# Patient Record
Sex: FEMALE | Race: WHITE | Employment: OTHER | URBAN - METROPOLITAN AREA
[De-identification: names, ages, dates, MRNs, and addresses within clinical notes are randomized per-mention and may not be internally consistent; named-entity substitution may affect disease eponyms.]

---

## 2017-06-02 ENCOUNTER — LAB ENCOUNTER (OUTPATIENT)
Dept: LAB | Age: 77
End: 2017-06-02
Attending: FAMILY MEDICINE
Payer: MEDICARE

## 2017-06-02 DIAGNOSIS — I10 HTN (HYPERTENSION): ICD-10-CM

## 2017-06-02 DIAGNOSIS — Z13.29 ENCOUNTER FOR SCREENING FOR OTHER SUSPECTED ENDOCRINE DISORDER: ICD-10-CM

## 2017-06-02 DIAGNOSIS — Z13.1 SCREENING FOR DIABETES MELLITUS: ICD-10-CM

## 2017-06-02 DIAGNOSIS — E78.5 HYPERLIPIDEMIA, UNSPECIFIED HYPERLIPIDEMIA TYPE: Primary | ICD-10-CM

## 2017-06-02 PROCEDURE — 85025 COMPLETE CBC W/AUTO DIFF WBC: CPT

## 2017-06-02 PROCEDURE — 36415 COLL VENOUS BLD VENIPUNCTURE: CPT

## 2017-06-02 PROCEDURE — 82306 VITAMIN D 25 HYDROXY: CPT

## 2017-06-02 PROCEDURE — 84439 ASSAY OF FREE THYROXINE: CPT

## 2017-06-02 PROCEDURE — 80061 LIPID PANEL: CPT

## 2017-06-02 PROCEDURE — 80053 COMPREHEN METABOLIC PANEL: CPT

## 2017-06-02 PROCEDURE — 83036 HEMOGLOBIN GLYCOSYLATED A1C: CPT

## 2017-06-02 PROCEDURE — 84443 ASSAY THYROID STIM HORMONE: CPT

## 2017-10-02 ENCOUNTER — HOSPITAL ENCOUNTER (OUTPATIENT)
Dept: MAMMOGRAPHY | Age: 77
Discharge: HOME | End: 2017-10-02
Attending: SURGERY
Payer: MEDICARE

## 2017-10-02 ENCOUNTER — TELEPHONE (OUTPATIENT)
Dept: SURGERY | Facility: CLINIC | Age: 77
End: 2017-10-02

## 2017-10-02 DIAGNOSIS — N60.91 ATYPICAL DUCTAL HYPERPLASIA OF RIGHT BREAST: ICD-10-CM

## 2017-10-02 DIAGNOSIS — Z12.31 ENCOUNTER FOR SCREENING MAMMOGRAM FOR HIGH-RISK PATIENT: ICD-10-CM

## 2017-10-02 DIAGNOSIS — Z12.31 ENCOUNTER FOR SCREENING MAMMOGRAM FOR HIGH-RISK PATIENT: Primary | ICD-10-CM

## 2017-10-02 PROCEDURE — 77063 BREAST TOMOSYNTHESIS BI: CPT | Performed by: SURGERY

## 2017-10-02 PROCEDURE — 77067 SCR MAMMO BI INCL CAD: CPT | Performed by: SURGERY

## 2017-10-05 ENCOUNTER — TELEPHONE (OUTPATIENT)
Dept: SURGERY | Facility: CLINIC | Age: 77
End: 2017-10-05

## 2017-10-05 NOTE — TELEPHONE ENCOUNTER
I notified the patient that Dr Orlando Murray had reviewed her last mammogram, \"the mammogram looks good.  She will be due for another exam prior to her next mammogram but should return to see me sooner with any new clinical concerns.  \"  Pt denies any new concer

## 2017-10-17 ENCOUNTER — HOSPITAL ENCOUNTER (OUTPATIENT)
Dept: BONE DENSITY | Age: 77
Discharge: HOME | End: 2017-10-17
Attending: FAMILY MEDICINE
Payer: MEDICARE

## 2017-10-17 DIAGNOSIS — M89.9 DISORDER OF BONE: ICD-10-CM

## 2017-10-17 PROCEDURE — 77080 DXA BONE DENSITY AXIAL: CPT | Performed by: FAMILY MEDICINE

## 2018-02-27 ENCOUNTER — OFFICE VISIT (OUTPATIENT)
Dept: SURGERY | Facility: CLINIC | Age: 78
End: 2018-02-27

## 2018-02-27 VITALS
HEART RATE: 83 BPM | TEMPERATURE: 98 F | WEIGHT: 123 LBS | RESPIRATION RATE: 18 BRPM | OXYGEN SATURATION: 99 % | HEIGHT: 64.5 IN | BODY MASS INDEX: 20.74 KG/M2

## 2018-02-27 DIAGNOSIS — Z12.39 BREAST CANCER SCREENING, HIGH RISK PATIENT: ICD-10-CM

## 2018-02-27 DIAGNOSIS — N60.91 ATYPICAL DUCTAL HYPERPLASIA OF RIGHT BREAST: Primary | ICD-10-CM

## 2018-02-27 PROCEDURE — 99214 OFFICE O/P EST MOD 30 MIN: CPT | Performed by: SURGERY

## 2018-05-21 NOTE — TELEPHONE ENCOUNTER
Bandar Avilez from [facility], called to say the current mammogram order is not approved per medicare with current dx code N60.91 for ADH.    Order placed with different code Spoke with the pt and advised of the notes from Dr. Arias Smith- he states that he did tolerate the lower dose of the norvasc.  Advised to go back to that dose- he v/u  Advised that Dr. Arias Smith wants him to start a different dieuretic and we will check his BP and BMP

## 2018-08-01 ENCOUNTER — LAB ENCOUNTER (OUTPATIENT)
Dept: LAB | Age: 78
End: 2018-08-01
Attending: FAMILY MEDICINE
Payer: MEDICARE

## 2018-08-01 DIAGNOSIS — M89.9 BONE DISEASE: ICD-10-CM

## 2018-08-01 DIAGNOSIS — E78.5 HYPERLIPEMIA: Primary | ICD-10-CM

## 2018-08-01 DIAGNOSIS — Z79.899 NEED FOR PROPHYLACTIC CHEMOTHERAPY: ICD-10-CM

## 2018-08-01 DIAGNOSIS — I25.10 CORONARY ATHEROSCLEROSIS OF NATIVE CORONARY ARTERY: ICD-10-CM

## 2018-08-01 LAB
ALBUMIN SERPL-MCNC: 3.4 G/DL (ref 3.5–4.8)
ALBUMIN/GLOB SERPL: 0.8 {RATIO} (ref 1–2)
ALP LIVER SERPL-CCNC: 101 U/L (ref 55–142)
ALT SERPL-CCNC: 19 U/L (ref 14–54)
ANION GAP SERPL CALC-SCNC: 6 MMOL/L (ref 0–18)
AST SERPL-CCNC: 19 U/L (ref 15–41)
BASOPHILS # BLD AUTO: 0.05 X10(3) UL (ref 0–0.1)
BASOPHILS NFR BLD AUTO: 0.6 %
BILIRUB SERPL-MCNC: 0.1 MG/DL (ref 0.1–2)
BILIRUB UR QL STRIP.AUTO: NEGATIVE
BUN BLD-MCNC: 21 MG/DL (ref 8–20)
BUN/CREAT SERPL: 18.3 (ref 10–20)
CALCIUM BLD-MCNC: 9.6 MG/DL (ref 8.3–10.3)
CHLORIDE SERPL-SCNC: 105 MMOL/L (ref 101–111)
CHOLEST SMN-MCNC: 223 MG/DL (ref ?–200)
CLARITY UR REFRACT.AUTO: CLEAR
CO2 SERPL-SCNC: 29 MMOL/L (ref 22–32)
COLOR UR AUTO: YELLOW
CREAT BLD-MCNC: 1.15 MG/DL (ref 0.55–1.02)
EOSINOPHIL # BLD AUTO: 0.11 X10(3) UL (ref 0–0.3)
EOSINOPHIL NFR BLD AUTO: 1.3 %
ERYTHROCYTE [DISTWIDTH] IN BLOOD BY AUTOMATED COUNT: 13.2 % (ref 11.5–16)
EST. AVERAGE GLUCOSE BLD GHB EST-MCNC: 114 MG/DL (ref 68–126)
GLOBULIN PLAS-MCNC: 4.4 G/DL (ref 2.5–3.7)
GLUCOSE BLD-MCNC: 94 MG/DL (ref 70–99)
GLUCOSE UR STRIP.AUTO-MCNC: NEGATIVE MG/DL
HBA1C MFR BLD HPLC: 5.6 % (ref ?–5.7)
HCT VFR BLD AUTO: 41.4 % (ref 34–50)
HDLC SERPL-MCNC: 83 MG/DL (ref 40–59)
HGB BLD-MCNC: 13.1 G/DL (ref 12–16)
IMMATURE GRANULOCYTE COUNT: 0.02 X10(3) UL (ref 0–1)
IMMATURE GRANULOCYTE RATIO %: 0.2 %
KETONES UR STRIP.AUTO-MCNC: NEGATIVE MG/DL
LDLC SERPL CALC-MCNC: 125 MG/DL (ref ?–100)
LEUKOCYTE ESTERASE UR QL STRIP.AUTO: NEGATIVE
LYMPHOCYTES # BLD AUTO: 2.66 X10(3) UL (ref 0.9–4)
LYMPHOCYTES NFR BLD AUTO: 31.6 %
M PROTEIN MFR SERPL ELPH: 7.8 G/DL (ref 6.1–8.3)
MCH RBC QN AUTO: 31.7 PG (ref 27–33.2)
MCHC RBC AUTO-ENTMCNC: 31.6 G/DL (ref 31–37)
MCV RBC AUTO: 100.2 FL (ref 81–100)
MONOCYTES # BLD AUTO: 0.84 X10(3) UL (ref 0.1–1)
MONOCYTES NFR BLD AUTO: 10 %
NEUTROPHIL ABS PRELIM: 4.73 X10 (3) UL (ref 1.3–6.7)
NEUTROPHILS # BLD AUTO: 4.73 X10(3) UL (ref 1.3–6.7)
NEUTROPHILS NFR BLD AUTO: 56.3 %
NITRITE UR QL STRIP.AUTO: NEGATIVE
NONHDLC SERPL-MCNC: 140 MG/DL (ref ?–130)
OSMOLALITY SERPL CALC.SUM OF ELEC: 293 MOSM/KG (ref 275–295)
PH UR STRIP.AUTO: 5 [PH] (ref 4.5–8)
PLATELET # BLD AUTO: 289 10(3)UL (ref 150–450)
POTASSIUM SERPL-SCNC: 4.2 MMOL/L (ref 3.6–5.1)
PROT UR STRIP.AUTO-MCNC: NEGATIVE MG/DL
RBC # BLD AUTO: 4.13 X10(6)UL (ref 3.8–5.1)
RBC UR QL AUTO: NEGATIVE
RED CELL DISTRIBUTION WIDTH-SD: 48.6 FL (ref 35.1–46.3)
SODIUM SERPL-SCNC: 140 MMOL/L (ref 136–144)
SP GR UR STRIP.AUTO: 1.01 (ref 1–1.03)
T4 FREE SERPL-MCNC: 1.1 NG/DL (ref 0.9–1.8)
TRIGL SERPL-MCNC: 77 MG/DL (ref 30–149)
TSI SER-ACNC: 2.41 MIU/ML (ref 0.35–5.5)
UROBILINOGEN UR STRIP.AUTO-MCNC: <2 MG/DL
VIT D+METAB SERPL-MCNC: 63.9 NG/ML (ref 30–100)
VLDLC SERPL CALC-MCNC: 15 MG/DL (ref 0–30)
WBC # BLD AUTO: 8.4 X10(3) UL (ref 4–13)

## 2018-08-01 PROCEDURE — 80053 COMPREHEN METABOLIC PANEL: CPT

## 2018-08-01 PROCEDURE — 81003 URINALYSIS AUTO W/O SCOPE: CPT

## 2018-08-01 PROCEDURE — 82306 VITAMIN D 25 HYDROXY: CPT

## 2018-08-01 PROCEDURE — 84439 ASSAY OF FREE THYROXINE: CPT

## 2018-08-01 PROCEDURE — 36415 COLL VENOUS BLD VENIPUNCTURE: CPT

## 2018-08-01 PROCEDURE — 85025 COMPLETE CBC W/AUTO DIFF WBC: CPT

## 2018-08-01 PROCEDURE — 83036 HEMOGLOBIN GLYCOSYLATED A1C: CPT

## 2018-08-01 PROCEDURE — 80061 LIPID PANEL: CPT

## 2018-08-01 PROCEDURE — 84443 ASSAY THYROID STIM HORMONE: CPT

## 2018-10-03 ENCOUNTER — HOSPITAL ENCOUNTER (OUTPATIENT)
Dept: MAMMOGRAPHY | Age: 78
Discharge: HOME | End: 2018-10-03
Attending: SURGERY
Payer: MEDICARE

## 2018-10-03 DIAGNOSIS — Z12.39 BREAST CANCER SCREENING, HIGH RISK PATIENT: ICD-10-CM

## 2018-10-03 PROCEDURE — 77063 BREAST TOMOSYNTHESIS BI: CPT | Performed by: SURGERY

## 2018-10-03 PROCEDURE — 77067 SCR MAMMO BI INCL CAD: CPT | Performed by: SURGERY

## 2018-12-04 ENCOUNTER — LAB ENCOUNTER (OUTPATIENT)
Dept: LAB | Age: 78
End: 2018-12-04
Attending: INTERNAL MEDICINE
Payer: MEDICARE

## 2018-12-04 DIAGNOSIS — E78.5 HYPERLIPEMIA: Primary | ICD-10-CM

## 2018-12-04 PROCEDURE — 36415 COLL VENOUS BLD VENIPUNCTURE: CPT

## 2018-12-04 PROCEDURE — 80061 LIPID PANEL: CPT

## 2019-06-19 ENCOUNTER — LAB ENCOUNTER (OUTPATIENT)
Dept: LAB | Age: 79
End: 2019-06-19
Attending: FAMILY MEDICINE
Payer: MEDICARE

## 2019-06-19 DIAGNOSIS — E78.5 HYPERLIPEMIA: ICD-10-CM

## 2019-06-19 DIAGNOSIS — I48.0 PAROXYSMAL ATRIAL FIBRILLATION (HCC): Primary | ICD-10-CM

## 2019-06-19 DIAGNOSIS — E55.9 AVITAMINOSIS D: ICD-10-CM

## 2019-06-19 DIAGNOSIS — Z13.1 SCREENING FOR DIABETES MELLITUS: ICD-10-CM

## 2019-06-19 PROCEDURE — 82306 VITAMIN D 25 HYDROXY: CPT

## 2019-06-19 PROCEDURE — 84439 ASSAY OF FREE THYROXINE: CPT

## 2019-06-19 PROCEDURE — 36415 COLL VENOUS BLD VENIPUNCTURE: CPT

## 2019-06-19 PROCEDURE — 80053 COMPREHEN METABOLIC PANEL: CPT

## 2019-06-19 PROCEDURE — 80061 LIPID PANEL: CPT

## 2019-06-19 PROCEDURE — 84443 ASSAY THYROID STIM HORMONE: CPT

## 2019-06-19 PROCEDURE — 83036 HEMOGLOBIN GLYCOSYLATED A1C: CPT

## 2019-06-19 PROCEDURE — 85025 COMPLETE CBC W/AUTO DIFF WBC: CPT

## 2019-10-04 ENCOUNTER — HOSPITAL ENCOUNTER (OUTPATIENT)
Dept: MAMMOGRAPHY | Age: 79
Discharge: HOME | End: 2019-10-04
Attending: INTERNAL MEDICINE
Payer: MEDICARE

## 2019-10-04 DIAGNOSIS — Z12.31 ENCOUNTER FOR SCREENING MAMMOGRAM FOR MALIGNANT NEOPLASM OF BREAST: ICD-10-CM

## 2019-10-04 PROCEDURE — 77067 SCR MAMMO BI INCL CAD: CPT | Performed by: INTERNAL MEDICINE

## 2019-10-04 PROCEDURE — 77063 BREAST TOMOSYNTHESIS BI: CPT | Performed by: INTERNAL MEDICINE

## 2020-01-01 ENCOUNTER — APPOINTMENT (OUTPATIENT)
Dept: GENERAL RADIOLOGY | Facility: HOSPITAL | Age: 80
End: 2020-01-01
Attending: EMERGENCY MEDICINE
Payer: MEDICARE

## 2020-01-01 ENCOUNTER — HOSPITAL ENCOUNTER (EMERGENCY)
Facility: HOSPITAL | Age: 80
Discharge: HOME | End: 2020-01-01
Attending: EMERGENCY MEDICINE
Payer: MEDICARE

## 2020-01-01 ENCOUNTER — HOSPITAL ENCOUNTER (OUTPATIENT)
Dept: GENERAL RADIOLOGY | Facility: HOSPITAL | Age: 80
Discharge: HOME | End: 2020-01-01
Attending: RADIOLOGY
Payer: MEDICARE

## 2020-01-01 ENCOUNTER — APPOINTMENT (OUTPATIENT)
Dept: LAB | Facility: HOSPITAL | Age: 80
End: 2020-01-01
Attending: FAMILY MEDICINE
Payer: MEDICARE

## 2020-01-01 ENCOUNTER — OFFICE VISIT (OUTPATIENT)
Dept: HEMATOLOGY/ONCOLOGY | Facility: HOSPITAL | Age: 80
End: 2020-01-01
Attending: INTERNAL MEDICINE
Payer: MEDICARE

## 2020-01-01 ENCOUNTER — APPOINTMENT (OUTPATIENT)
Dept: CT IMAGING | Facility: HOSPITAL | Age: 80
DRG: 054 | End: 2020-01-01
Attending: EMERGENCY MEDICINE
Payer: MEDICARE

## 2020-01-01 ENCOUNTER — HOSPITAL ENCOUNTER (INPATIENT)
Dept: RADIATION ONCOLOGY | Facility: HOSPITAL | Age: 80
Discharge: HOME | DRG: 054 | End: 2020-01-01
Attending: EMERGENCY MEDICINE
Payer: MEDICARE

## 2020-01-01 ENCOUNTER — HOSPITAL ENCOUNTER (INPATIENT)
Facility: HOSPITAL | Age: 80
LOS: 1 days | DRG: 054 | End: 2020-01-01
Attending: INTERNAL MEDICINE | Admitting: INTERNAL MEDICINE
Payer: OTHER MISCELLANEOUS

## 2020-01-01 ENCOUNTER — APPOINTMENT (OUTPATIENT)
Dept: MRI IMAGING | Facility: HOSPITAL | Age: 80
DRG: 054 | End: 2020-01-01
Attending: EMERGENCY MEDICINE
Payer: MEDICARE

## 2020-01-01 ENCOUNTER — HOSPITAL ENCOUNTER (OUTPATIENT)
Dept: CT IMAGING | Facility: HOSPITAL | Age: 80
Discharge: HOME | End: 2020-01-01
Attending: INTERNAL MEDICINE
Payer: MEDICARE

## 2020-01-01 ENCOUNTER — APPOINTMENT (OUTPATIENT)
Dept: LAB | Facility: HOSPITAL | Age: 80
End: 2020-01-01
Attending: INTERNAL MEDICINE
Payer: MEDICARE

## 2020-01-01 ENCOUNTER — HOSPITAL ENCOUNTER (INPATIENT)
Facility: HOSPITAL | Age: 80
LOS: 2 days | Discharge: HOSPICE | DRG: 054 | End: 2020-01-01
Attending: EMERGENCY MEDICINE | Admitting: INTERNAL MEDICINE
Payer: MEDICARE

## 2020-01-01 VITALS
WEIGHT: 105 LBS | TEMPERATURE: 97 F | RESPIRATION RATE: 18 BRPM | HEIGHT: 64 IN | HEART RATE: 65 BPM | OXYGEN SATURATION: 100 % | DIASTOLIC BLOOD PRESSURE: 67 MMHG | SYSTOLIC BLOOD PRESSURE: 155 MMHG | BODY MASS INDEX: 17.93 KG/M2

## 2020-01-01 VITALS
WEIGHT: 110 LBS | OXYGEN SATURATION: 99 % | HEART RATE: 78 BPM | BODY MASS INDEX: 18.55 KG/M2 | TEMPERATURE: 97 F | SYSTOLIC BLOOD PRESSURE: 168 MMHG | RESPIRATION RATE: 16 BRPM | HEIGHT: 64.72 IN | DIASTOLIC BLOOD PRESSURE: 72 MMHG

## 2020-01-01 VITALS
RESPIRATION RATE: 16 BRPM | TEMPERATURE: 98 F | HEART RATE: 69 BPM | OXYGEN SATURATION: 100 % | SYSTOLIC BLOOD PRESSURE: 124 MMHG | BODY MASS INDEX: 18.55 KG/M2 | DIASTOLIC BLOOD PRESSURE: 68 MMHG | WEIGHT: 110 LBS | HEIGHT: 64.5 IN

## 2020-01-01 VITALS
BODY MASS INDEX: 18.21 KG/M2 | HEART RATE: 93 BPM | DIASTOLIC BLOOD PRESSURE: 91 MMHG | RESPIRATION RATE: 32 BRPM | WEIGHT: 106.63 LBS | OXYGEN SATURATION: 86 % | SYSTOLIC BLOOD PRESSURE: 163 MMHG | TEMPERATURE: 98 F | HEIGHT: 64 IN

## 2020-01-01 VITALS — OXYGEN SATURATION: 61 %

## 2020-01-01 DIAGNOSIS — R59.0 MEDIASTINAL LYMPHADENOPATHY: ICD-10-CM

## 2020-01-01 DIAGNOSIS — R91.8 RIGHT LOWER LOBE LUNG MASS: ICD-10-CM

## 2020-01-01 DIAGNOSIS — R42 VERTIGO: ICD-10-CM

## 2020-01-01 DIAGNOSIS — C79.31 MELANOMA METASTATIC TO BRAIN (HCC): Primary | ICD-10-CM

## 2020-01-01 DIAGNOSIS — K59.00 CONSTIPATION, UNSPECIFIED CONSTIPATION TYPE: Primary | ICD-10-CM

## 2020-01-01 DIAGNOSIS — R91.8 RIGHT LOWER LOBE LUNG MASS: Primary | ICD-10-CM

## 2020-01-01 DIAGNOSIS — C43.39: ICD-10-CM

## 2020-01-01 DIAGNOSIS — R91.8 PULMONARY MASS: ICD-10-CM

## 2020-01-01 DIAGNOSIS — R63.4 WEIGHT LOSS: ICD-10-CM

## 2020-01-01 DIAGNOSIS — R47.81 SLURRED SPEECH: ICD-10-CM

## 2020-01-01 PROCEDURE — 70553 MRI BRAIN STEM W/O & W/DYE: CPT | Performed by: EMERGENCY MEDICINE

## 2020-01-01 PROCEDURE — 99233 SBSQ HOSP IP/OBS HIGH 50: CPT | Performed by: OTHER

## 2020-01-01 PROCEDURE — 74018 RADEX ABDOMEN 1 VIEW: CPT | Performed by: EMERGENCY MEDICINE

## 2020-01-01 PROCEDURE — 88341 IMHCHEM/IMCYTCHM EA ADD ANTB: CPT | Performed by: INTERNAL MEDICINE

## 2020-01-01 PROCEDURE — 51702 INSERT TEMP BLADDER CATH: CPT

## 2020-01-01 PROCEDURE — 99233 SBSQ HOSP IP/OBS HIGH 50: CPT | Performed by: INTERNAL MEDICINE

## 2020-01-01 PROCEDURE — 88305 TISSUE EXAM BY PATHOLOGIST: CPT | Performed by: INTERNAL MEDICINE

## 2020-01-01 PROCEDURE — 99205 OFFICE O/P NEW HI 60 MIN: CPT | Performed by: INTERNAL MEDICINE

## 2020-01-01 PROCEDURE — 99152 MOD SED SAME PHYS/QHP 5/>YRS: CPT | Performed by: INTERNAL MEDICINE

## 2020-01-01 PROCEDURE — 88381 MICRODISSECTION MANUAL: CPT | Performed by: INTERNAL MEDICINE

## 2020-01-01 PROCEDURE — 70450 CT HEAD/BRAIN W/O DYE: CPT | Performed by: EMERGENCY MEDICINE

## 2020-01-01 PROCEDURE — 71045 X-RAY EXAM CHEST 1 VIEW: CPT | Performed by: RADIOLOGY

## 2020-01-01 PROCEDURE — 32405 CT BIOPSY LUNG OR MEDIASTINUM (CPT=77012/32405): CPT | Performed by: INTERNAL MEDICINE

## 2020-01-01 PROCEDURE — 99283 EMERGENCY DEPT VISIT LOW MDM: CPT

## 2020-01-01 PROCEDURE — 36415 COLL VENOUS BLD VENIPUNCTURE: CPT

## 2020-01-01 PROCEDURE — 99223 1ST HOSP IP/OBS HIGH 75: CPT | Performed by: OTHER

## 2020-01-01 PROCEDURE — 85610 PROTHROMBIN TIME: CPT

## 2020-01-01 PROCEDURE — 99153 MOD SED SAME PHYS/QHP EA: CPT | Performed by: INTERNAL MEDICINE

## 2020-01-01 PROCEDURE — 77012 CT SCAN FOR NEEDLE BIOPSY: CPT | Performed by: INTERNAL MEDICINE

## 2020-01-01 PROCEDURE — 81210 BRAF GENE: CPT | Performed by: INTERNAL MEDICINE

## 2020-01-01 PROCEDURE — 99222 1ST HOSP IP/OBS MODERATE 55: CPT | Performed by: NURSE PRACTITIONER

## 2020-01-01 PROCEDURE — 99291 CRITICAL CARE FIRST HOUR: CPT | Performed by: NURSE PRACTITIONER

## 2020-01-01 PROCEDURE — 99223 1ST HOSP IP/OBS HIGH 75: CPT | Performed by: INTERNAL MEDICINE

## 2020-01-01 PROCEDURE — 88342 IMHCHEM/IMCYTCHM 1ST ANTB: CPT | Performed by: INTERNAL MEDICINE

## 2020-01-01 PROCEDURE — 85027 COMPLETE CBC AUTOMATED: CPT

## 2020-01-01 RX ORDER — ONDANSETRON 2 MG/ML
4 INJECTION INTRAMUSCULAR; INTRAVENOUS EVERY 6 HOURS PRN
Status: DISCONTINUED | OUTPATIENT
Start: 2020-01-01 | End: 2020-10-22

## 2020-01-01 RX ORDER — DEXAMETHASONE SODIUM PHOSPHATE 4 MG/ML
10 VIAL (ML) INJECTION ONCE
Status: COMPLETED | OUTPATIENT
Start: 2020-01-01 | End: 2020-01-01

## 2020-01-01 RX ORDER — GLYCOPYRROLATE 0.2 MG/ML
0.4 INJECTION, SOLUTION INTRAMUSCULAR; INTRAVENOUS
Status: DISCONTINUED | OUTPATIENT
Start: 2020-01-01 | End: 2020-10-22

## 2020-01-01 RX ORDER — ONDANSETRON 4 MG/1
4 TABLET, ORALLY DISINTEGRATING ORAL EVERY 6 HOURS PRN
Status: DISCONTINUED | OUTPATIENT
Start: 2020-01-01 | End: 2020-10-22

## 2020-01-01 RX ORDER — DEXTROSE MONOHYDRATE 25 G/50ML
50 INJECTION, SOLUTION INTRAVENOUS
Status: DISCONTINUED | OUTPATIENT
Start: 2020-01-01 | End: 2020-01-01

## 2020-01-01 RX ORDER — ACETAMINOPHEN 325 MG/1
650 TABLET ORAL EVERY 6 HOURS PRN
Status: DISCONTINUED | OUTPATIENT
Start: 2020-01-01 | End: 2020-01-01

## 2020-01-01 RX ORDER — HYDROCODONE BITARTRATE AND ACETAMINOPHEN 5; 325 MG/1; MG/1
2 TABLET ORAL EVERY 4 HOURS PRN
Status: DISCONTINUED | OUTPATIENT
Start: 2020-01-01 | End: 2020-01-01

## 2020-01-01 RX ORDER — MORPHINE SULFATE 2 MG/ML
1 INJECTION, SOLUTION INTRAMUSCULAR; INTRAVENOUS
Status: DISCONTINUED | OUTPATIENT
Start: 2020-01-01 | End: 2020-10-22

## 2020-01-01 RX ORDER — SCOLOPAMINE TRANSDERMAL SYSTEM 1 MG/1
1 PATCH, EXTENDED RELEASE TRANSDERMAL
Status: DISCONTINUED | OUTPATIENT
Start: 2020-01-01 | End: 2020-10-22

## 2020-01-01 RX ORDER — ONDANSETRON 2 MG/ML
4 INJECTION INTRAMUSCULAR; INTRAVENOUS ONCE
Status: COMPLETED | OUTPATIENT
Start: 2020-01-01 | End: 2020-01-01

## 2020-01-01 RX ORDER — MIDAZOLAM HYDROCHLORIDE 1 MG/ML
INJECTION INTRAMUSCULAR; INTRAVENOUS
Status: DISCONTINUED
Start: 2020-01-01 | End: 2020-01-01

## 2020-01-01 RX ORDER — NALOXONE HYDROCHLORIDE 0.4 MG/ML
80 INJECTION, SOLUTION INTRAMUSCULAR; INTRAVENOUS; SUBCUTANEOUS AS NEEDED
Status: DISCONTINUED | OUTPATIENT
Start: 2020-01-01 | End: 2020-01-01

## 2020-01-01 RX ORDER — ACETAMINOPHEN 160 MG/5ML
650 SOLUTION ORAL EVERY 4 HOURS PRN
Status: DISCONTINUED | OUTPATIENT
Start: 2020-01-01 | End: 2020-10-22

## 2020-01-01 RX ORDER — DEXAMETHASONE SODIUM PHOSPHATE 4 MG/ML
4 VIAL (ML) INJECTION EVERY 6 HOURS
Status: DISCONTINUED | OUTPATIENT
Start: 2020-01-01 | End: 2020-01-01

## 2020-01-01 RX ORDER — HYDROCODONE BITARTRATE AND ACETAMINOPHEN 5; 325 MG/1; MG/1
1 TABLET ORAL EVERY 4 HOURS PRN
Status: DISCONTINUED | OUTPATIENT
Start: 2020-01-01 | End: 2020-01-01

## 2020-01-01 RX ORDER — ACETAMINOPHEN 650 MG/1
650 SUPPOSITORY RECTAL EVERY 4 HOURS PRN
Status: DISCONTINUED | OUTPATIENT
Start: 2020-01-01 | End: 2020-10-22

## 2020-01-01 RX ORDER — FLUMAZENIL 0.1 MG/ML
0.2 INJECTION, SOLUTION INTRAVENOUS AS NEEDED
Status: DISCONTINUED | OUTPATIENT
Start: 2020-01-01 | End: 2020-01-01

## 2020-01-01 RX ORDER — ONDANSETRON 2 MG/ML
INJECTION INTRAMUSCULAR; INTRAVENOUS
Status: DISPENSED
Start: 2020-01-01 | End: 2020-01-01

## 2020-01-01 RX ORDER — ONDANSETRON 2 MG/ML
4 INJECTION INTRAMUSCULAR; INTRAVENOUS EVERY 6 HOURS PRN
Status: DISCONTINUED | OUTPATIENT
Start: 2020-01-01 | End: 2020-01-01

## 2020-01-01 RX ORDER — ACETAMINOPHEN 325 MG/1
650 TABLET ORAL EVERY 4 HOURS PRN
Status: DISCONTINUED | OUTPATIENT
Start: 2020-01-01 | End: 2020-01-01

## 2020-01-01 RX ORDER — LORAZEPAM 2 MG/ML
0.5 INJECTION INTRAMUSCULAR EVERY 4 HOURS PRN
Status: DISCONTINUED | OUTPATIENT
Start: 2020-01-01 | End: 2020-10-22

## 2020-01-01 RX ORDER — MAGNESIUM CARB/ALUMINUM HYDROX 105-160MG
296 TABLET,CHEWABLE ORAL ONCE
Status: COMPLETED | OUTPATIENT
Start: 2020-01-01 | End: 2020-01-01

## 2020-01-01 RX ORDER — HALOPERIDOL 5 MG/ML
2 INJECTION INTRAMUSCULAR
Status: DISCONTINUED | OUTPATIENT
Start: 2020-01-01 | End: 2020-10-22

## 2020-01-01 RX ORDER — FAMOTIDINE 10 MG/ML
20 INJECTION, SOLUTION INTRAVENOUS EVERY MORNING
Status: DISCONTINUED | OUTPATIENT
Start: 2020-01-01 | End: 2020-01-01

## 2020-01-01 RX ORDER — LORAZEPAM 2 MG/ML
1 INJECTION INTRAMUSCULAR EVERY 4 HOURS PRN
Status: DISCONTINUED | OUTPATIENT
Start: 2020-01-01 | End: 2020-10-22

## 2020-01-01 RX ORDER — LORAZEPAM 2 MG/ML
2 INJECTION INTRAMUSCULAR EVERY 4 HOURS PRN
Status: DISCONTINUED | OUTPATIENT
Start: 2020-01-01 | End: 2020-10-22

## 2020-01-01 RX ORDER — LABETALOL HYDROCHLORIDE 5 MG/ML
10 INJECTION, SOLUTION INTRAVENOUS EVERY 4 HOURS PRN
Status: DISCONTINUED | OUTPATIENT
Start: 2020-01-01 | End: 2020-01-01

## 2020-01-01 RX ORDER — BISACODYL 10 MG
10 SUPPOSITORY, RECTAL RECTAL
Status: DISCONTINUED | OUTPATIENT
Start: 2020-01-01 | End: 2020-10-22

## 2020-01-01 RX ORDER — HALOPERIDOL 5 MG/ML
1 INJECTION INTRAMUSCULAR
Status: DISCONTINUED | OUTPATIENT
Start: 2020-01-01 | End: 2020-10-22

## 2020-01-01 RX ORDER — HYDRALAZINE HYDROCHLORIDE 20 MG/ML
10 INJECTION INTRAMUSCULAR; INTRAVENOUS EVERY 4 HOURS PRN
Status: DISCONTINUED | OUTPATIENT
Start: 2020-01-01 | End: 2020-01-01

## 2020-01-01 RX ORDER — SODIUM CHLORIDE 9 MG/ML
INJECTION, SOLUTION INTRAVENOUS CONTINUOUS
Status: DISCONTINUED | OUTPATIENT
Start: 2020-01-01 | End: 2020-01-01

## 2020-01-01 RX ORDER — ATROPINE SULFATE 10 MG/ML
2 SOLUTION/ DROPS OPHTHALMIC EVERY 2 HOUR PRN
Status: DISCONTINUED | OUTPATIENT
Start: 2020-01-01 | End: 2020-10-22

## 2020-01-01 RX ORDER — MIDAZOLAM HYDROCHLORIDE 1 MG/ML
1 INJECTION INTRAMUSCULAR; INTRAVENOUS EVERY 5 MIN PRN
Status: ACTIVE | OUTPATIENT
Start: 2020-01-01 | End: 2020-01-01

## 2020-01-01 RX ORDER — PROCHLORPERAZINE EDISYLATE 5 MG/ML
10 INJECTION INTRAMUSCULAR; INTRAVENOUS EVERY 6 HOURS PRN
Status: DISCONTINUED | OUTPATIENT
Start: 2020-01-01 | End: 2020-01-01

## 2020-01-01 RX ADMIN — MIDAZOLAM HYDROCHLORIDE 0.5 MG: 1 INJECTION INTRAMUSCULAR; INTRAVENOUS at 09:19:00

## 2020-01-01 RX ADMIN — MIDAZOLAM HYDROCHLORIDE 1 MG: 1 INJECTION INTRAMUSCULAR; INTRAVENOUS at 09:15:00

## 2020-01-01 RX ADMIN — SODIUM CHLORIDE: 9 INJECTION, SOLUTION INTRAVENOUS at 09:03:00

## 2020-09-28 NOTE — ED PROVIDER NOTES
Patient Seen in: [facility] Emergency Department      History   Patient presents with:  Abdomen/Flank Pain    Stated Complaint: post barium study on monday blockage    HPI     71-year-old female here for constipation.   This patient had a CT scan wit (BMS)     • CHOLECYSTECTOMY     • LES BIOPSY STEREO NODULE 1 SITE RIGHT (CPT=19081) Right 9-2014    ATYPIA   • LES LOCALIZATION WIRE 1 SITE RIGHT (HFL=29847) Right 9-2014    ATYPIA   • SURGICAL STENT  8/29/2013                    Social History    Tobacco MD on 9/28/2020 at 3:52 PM     Finalized by (CST): Anna Black MD on 9/28/2020 at 3:56 PM             MDM      Patient is essentially asymptomatic but has constipation over last several days.   She has a large amount of stool noted on her KUB there i

## 2020-09-28 NOTE — ED INITIAL ASSESSMENT (HPI)
Patient presents for evaluation of inability to have a bowel movement for the past week. She states she had a CT scan with barium contrast on Monday for a 15 lb weight loss over the last couple months. She reports taking zofran for \"queasiness\".

## 2020-10-01 NOTE — CONSULTS
[facility] Report of Consultation    Patient Name: Karen August   YOB: 1940   Medical Record Number: [id number]   CSN: [id number]   Consulting Physician: Felicitas Hernandez MD  Referring Physician(s): No ref.  provider found  Date of Co BIOPSY NEEDLE LOCALIZATION Right 9/23/2014    Performed by Sasha Sahni MD at [facility] MAIN OR   • CATARACT     • CATH BARE METAL STENT (BMS)     • CHOLECYSTECTOMY     • LES BIOPSY STEREO NODULE 1 SITE RIGHT (CPT=19081) Right 9-2014    ATYPIA   • LES LOCAL meetings of clubs or organizations: Not on file        Relationship status: Not on file      Intimate partner violence        Fear of current or ex partner: Not on file        Emotionally abused: Not on file        Physically abused: Not on file        For lymphadenopathy. Musculoskeletal: Negative for myalgias, arthralgias, muscle weakness. Genitourinary: Negative for dysuria or hematuria  Neurological: Negative for headaches, dizziness, speech problems, gait problems and focal weakness.   Psychiatric: The Radiologic imaging reviewed at this visit:    [facility] was reviewed as stated in the HPI.     Assessment/Plan:    RLL lung masses (5.5 cm and 3.5 cm)  H/O malignant melanoma of the skin of the chin in 5/2017  Mediastinal adenopathy  Weigh

## 2020-10-08 NOTE — IMAGING NOTE
Patient tolerated procedural sedation without immediate complications. Denies pain. Report given To Casey Hummel RN in [facility]. Patient transferred to [facility] via cart. Accompanied by RN and .

## 2020-10-08 NOTE — PROCEDURES
[facility]  Procedure Note    Benjamín Bautista Patient Status:  Outpatient    1940 MRN [id number]   [facility] CT Attending Arely Orellana MD   Hosp Day # 0 PCP Nyla Tong MD     Procedure: right lung mass biopsy    P

## 2020-10-19 PROBLEM — R47.81 SLURRED SPEECH: Status: ACTIVE | Noted: 2020-01-01

## 2020-10-19 PROBLEM — R42 VERTIGO: Status: ACTIVE | Noted: 2020-01-01

## 2020-10-19 PROBLEM — C79.31 MELANOMA METASTATIC TO BRAIN (HCC): Status: ACTIVE | Noted: 2020-01-01

## 2020-10-19 NOTE — CONSULTS
[facility]  Neuro Critical Care Consult Note     Patient Status:  Inpatient    1940 MRN [id number]   [facility] 6NE-A Attending Lucillie Ahumada, MD   Hosp Day # 0 PCP Cristi Gudino MD     Date of RIGHT (CPT=19281) Right 9-2014    ATYPIA   • NEEDLE BIOPSY RIGHT      Right lower lobe, 10/8/2020   • SURGICAL STENT  8/29/2013   • TONSILLECTOMY             Codeine                     Comment:Light-headed.   Statins                     Comment:Irregular h Subcutaneous, Q6H  •  dexamethasone Sodium Phosphate (DECADRON) 4 MG/ML injection 4 mg, 4 mg, Intravenous, Q6H  •  Labetalol HCl (TRANDATE) injection 10 mg, 10 mg, Intravenous, Q4H PRN  •  hydrALAzine HCl (APRESOLINE) injection 10 mg, 10 mg, Intravenous, Q HGB 11.8 10/19/2020    HCT 37.0 10/19/2020    .0 10/19/2020    CREATSERUM 0.87 10/19/2020    BUN 17 10/19/2020     10/19/2020    K 3.9 10/19/2020     10/19/2020    CO2 27.0 10/19/2020     10/19/2020    CA 9.4 10/19/2020    ALB 2.9

## 2020-10-19 NOTE — PLAN OF CARE
Assumed care of pt at 0730, pt sitting up in bed, neuro per flowsheet. PT/OT in to see pt, assisted to chair. Up x2, max assist with gait belt. Pt remains incontinent with brief on.  Speech to bedside, palliative consult placed, spoke to [address] [place] [address] who stated sh

## 2020-10-19 NOTE — PROGRESS NOTES
[facility]  Neurocritical Care APRN Progress Note    NAME: Karen Doss - ROOM: 3020/0397-R - MRN: [id number] - Age: [de-identified]year old - PNA:0/26/2148    History Of Present Illness:  Karen Doss is a [de-identified]year old female with • High blood pressure    • Meniere's disease    • Other and unspecified hyperlipidemia    • PAC (premature atrial contraction)    • Visual impairment     glasses       Social Hx:  Social History    Tobacco Use      Smoking status: Never Smoker      Smokele Neurologic: This patient is alert and orientated x 3. Speech slurred. Pupils equally round and reactive to light. 3+ brisk bilaterally. EOMs intact. Visual fields are full. Diplopia with both eyes open  Tongue is midline.  Face is asymmetrical, left nicola CONCLUSION:  Uneventful CT-guided right lung biopsy, as described above. The patient was instructed to obtain follow up care and biopsy results from the referring physician.     Dictated by (CST): Tigist Carreno MD on 10/08/2020 at 10:19 AM     Finalized by A total of 35 minutes of critical care time (exclusive of billable procedures) was administered. This involved direct patient intervention, complex decision making, and/or extensive discussions with the patient, family, and clinical staff.

## 2020-10-19 NOTE — PLAN OF CARE
Problem: Impaired Swallowing  Goal: Minimize aspiration risk  Description: Interventions:  - Patient should be alert and upright for all feedings (90 degrees preferred)  - Offer food and liquids at a slow rate  - No straws; liquids by tsp  - Encourage sm

## 2020-10-19 NOTE — OCCUPATIONAL THERAPY NOTE
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    Physician Order: IP Consult to [facility] Meniere's disease    • Migraines    • Other and unspecified hyperlipidemia    • PAC (premature atrial contraction)    • Visual impairment     glasses       Past Surgical History  Past Surgical History:   Procedure Laterality Date   • APPENDECTOMY     • BIO solutions    VISION  Current Vision: wears glasses all the time  Tracking:  decreased smoothness of horizontal tracking, decreased smoothness of vertical tracking and requires cues, head turns, or add eye shifts to track  Double vision noted with both eyes items without seeing double, but pt was squinting her L eye. OT will attempt to apply temporary eye patch next session. Increased time to initiate tasks and to process 1-step instructions.    After 2 cueing, pt was able to use both hands to remove a pair living, rest and sleep, work, leisure and social participation. Recommend JAUN. OT will continue to assess discharge recommendation, pending possible family care conference.       The patient is functioning below her previous functional level and would alejandra

## 2020-10-19 NOTE — ED PROVIDER NOTES
Patient Seen in: [facility] Emergency Department      History   Patient presents with:  Nausea/Vomiting/Diarrhea    Stated Complaint: VOMITING    HPI    77-year-old female history of recently diagnosed melanoma this week with metastatic disease to t Social History    Tobacco Use      Smoking status: Never Smoker      Smokeless tobacco: Never Used    Alcohol use:  Yes      Alcohol/week: 0.0 - 1.0 standard drinks      Frequency: Monthly or less      Drinks per session: 1 or 2      [address] [place] [address] Capillary refill takes less than 2 seconds. Neurological:      Mental Status: She is alert. Mental status is at baseline. Cranial Nerves: Cranial nerve deficit present.       Comments: Decreased sensation to light touch left face, arm and leg persist AND SCREEN.   Procedure                               Abnormality         Status                     ---------                               -----------         ------                     ABORH (BLOOD [facility])[[id number]]                               Final res right cerebellar hemisphere measuring 1.4 cm. There is edema in the central right hemisphere posterior to the brachium pontis. The lesions in the posterior fossa are consistent with hemorrhagic metastatic lesions.   This can be seen in patients with melan manipulate and support vital system functions to treat single or multiple organ system failure and/or to prevent further life threatening deterioration of the patient's condition. Interventions were performed as documented above.      TOTAL CRITICAL CARE TI

## 2020-10-19 NOTE — SLP NOTE
ADULT SWALLOWING EVALUATION    ASSESSMENT    ASSESSMENT/OVERALL IMPRESSION:  Patient seen for swallowing evaluation per protocol. Patient alert and up in bed, admitted due to vomiting and dizziness.   She reports she has note been eating much due to nausea position; Slow rate;Small bites and sips; No straw;Cueing to swallow  Medication Administration Recommendations: Crushed in puree  Treatment Plan/Recommendations: Dysphagia therapy  Discharge Recommendations/Plan: Undetermined    HISTORY   MEDICAL HISTORY  R EXAMINATION  Dentition: Edentulous  Symmetry: Reduced right facial  Strength: Reduced right facial  Tone: Reduced right facial  Range of Motion: Reduced right facial  Rate of Motion: Reduced    Voice Quality: Clear  Respiratory Status: Unlabored  Consisten

## 2020-10-19 NOTE — CONSULTS
[facility]     PATIENT:   Akilah Whitmore MD:  Shweta James MD      DIAGNOSIS:   Brain mets        CC:    Slurred speech    HPI   [de-identified] yo came in thru ED with nausea, dizziness, double vision, slu

## 2020-10-19 NOTE — CONSULTS
[facility]  Report of Consultation    [facility] Reasons Patient Status:  Inpatient    1940 MRN [id number]   [facility] 6NE-A Attending Twila Holman MD   Hosp Day # 0 PCP Omar Baptiste MD     Reason for [facility] impairment     glasses       Past Surgical History:   Procedure Laterality Date   • APPENDECTOMY     • BIOPSY OF SKIN LESION Left     Left chin below lip and right shin - both benign   • BREAST BIOPSY Right 09/23/2014      Right breast excisional biopsy wi Glucose-Vitamin C (DEX-4) chewable tab 8 tablet, 8 tablet, Oral, Q15 Min PRN  •  Insulin Aspart Pen (NOVOLOG) 100 UNIT/ML flexpen 1-5 Units, 1-5 Units, Subcutaneous, Q6H  •  dexamethasone Sodium Phosphate (DECADRON) 4 MG/ML injection 4 mg, 4 mg, Intravenou percussion. No rales. No wheezes. Cardiovascular: Regular rate and rhythm. Gastrointestinal: Soft, non tender with good bowel sounds. Extremities: No edema. No calf tenderness. Lymphatics:  There is no palpable lymphadenopathy throughout in the cervic approximately 25 in number, with evidence of hemorrhage in the right pontine and bilateral cerebellar metastases. Many of these metastases demonstrate associated vasogenic edema, most notable within the right malini and bilateral cerebellum.      2. There is

## 2020-10-19 NOTE — PROGRESS NOTES
Received pt @ 0130 from ED, awaiting MRI results. Neuros Q1-slurred speech, L side decreased sensation, left facial drop, double vision-see flow sheet for full assessment. SBP goal <150, maintained with prn meds. NPO, incontinent.  Pt complained of nausea,

## 2020-10-19 NOTE — DIETARY NOTE
[facility]    NUTRITION INITIAL ASSESSMENT    Pt does not meet malnutrition criteria.     NUTRITION DIAGNOSIS/PROBLEM:    Inadequate energy intake related to decreased po intake nausea and dizziness as evidenced by reduced po intake over past couple of at this time     2.  Fluid Accumulation: none per RN documentation     NUTRITION PRESCRIPTION:  Calories: 2172-1655 calories/day (30-35 calories per kg)  Protein: 58-72 grams protein/day (1.2-1.5 grams protein per kg)  Fluid: ~1 ml/kcal or per MD discretion

## 2020-10-19 NOTE — H&P
[de-identified]year old female with pmx of PAF, Hx Melanoma in remote past admitted sec to Rt UE weakness/ facial droop. Pt was in office 3 weeks ago sec to weight loss. Work up showed Pulmonary mets. Biopsy confirmed  Metastatic melanoma.  Pt currently followed by T[facility]MY diarrhea  Welchol [Colesevela*        Comment:cramps     Social History    Tobacco Use      Smoking status: Never Smoker      Smokeless tobacco: Never Used    Alcohol use:  Yes      Alcohol/week: 0.0 - 1.0 standard drinks      Frequency: Monthly or less 10/19/2020    TROP <0.045 10/18/2020         A/p   Slurred speech/ CVA. -Multiple mets to brain.  -Rad-Onc following.  -Neurology following.  -Cont Decadron IV. -Prognosis poor.  -Palliative care consult,. GI Prophylaxis.  -Cont PPI.     Prognosis Po

## 2020-10-19 NOTE — CONSULTS
[facility]  Neurosurgery Consult    Chio Jurado Patient Status:  Inpatient    1940 MRN [id number]   [facility] 6NE-A Attending Roc Leon MD   Hosp Day # 0 PCP Kel Jordan MD     [address]  hyperlipidemia    • PAC (premature atrial contraction)    • Visual impairment     glasses       PAST SURGICAL HISTORY:  Past Surgical History:   Procedure Laterality Date   • APPENDECTOMY     • BIOPSY OF SKIN LESION Left     Left chin below lip and right s Vitamins-Minerals (VITEYES COMPLETE) Oral Cap, Take 2 capsules by mouth daily. , Disp: , Rfl: , 10/18/2020 at Unknown time        •  0.9% NaCl infusion, , Intravenous, Continuous    •  acetaminophen (TYLENOL) tab 650 mg, 650 mg, Oral, Q6H PRN    •  glucose light touch is decreased on left side. Left Pronator Drift. Strengths 5-/5 to LUE and 5/5 to RUE; 4+/5 to LLE and 5/5 to RLE. JANINA slowed to left. Gait deferred. Skin: Previous biopsy site to left chin well-healed.     DIAGNOSTIC DATA:   Lab Results for further evaluation.                   =====  CONCLUSION:  CT findings suggest metastatic brain disease with non hemorrhagic and hemorrhagic lesions seen above the tentorium and hemorrhagic lesions seen within the brainstem and bilateral cyst cerebellar malignant melanoma. Plan:   Will discuss care with Dr. Dumont Oar  No acute surgical intervention  Appreciate radiation oncology and oncology recommendations  Continue Decadron as ordered  Pepcid for GI Prophylaxis  Recommend alternating eye patching for dipl

## 2020-10-19 NOTE — PHYSICAL THERAPY NOTE
PHYSICAL THERAPY EVALUATION - INPATIENT     Room Number: 8475/9336-M  Evaluation Date: 10/19/2020  Type of Evaluation: Initial  Physician Order: PT Eval and Treat    Presenting Problem: Melanoma metastatic to brain  Reason for Therapy: Mobility Dysfuncti LESION Left     Left chin below lip and right shin - both benign   • BREAST BIOPSY Right 09/23/2014      Right breast excisional biopsy with preoperative wire localization as well as excision of right breast mass    • BREAST BIOPSY NEEDLE LOCALIZATION Righ awareness of need for assistance and decreased awareness of need for safety  Awareness of Errors:  assistance required to identify errors made and assistance required to correct errors made  Awareness of Deficits:  decreased awareness of deficits  Problem prolonged time to initiate and achieve all desired mobility. Pt required Max A for supine>sit with manual cues for hand placement on bedrail as pt missing target. Pt was able to sit upright at EOB x10 minutes with L sided lean and Min A for support.   Aisha Terry from skilled inpatient PT to address the above deficits to assist patient in returning to prior to level of function. Based on this evaluation, patient's clinical presentation is evolving and overall the evaluation complexity is considered moderate.     DI

## 2020-10-19 NOTE — PLAN OF CARE
Problem: Impaired Activities of Daily Living  Goal: Achieve highest/safest level of independence in self care  Description: Interventions:  - Assess ability and encourage patient to participate in ADLs to maximize function  - Promote sitting position [facility]

## 2020-10-20 NOTE — PLAN OF CARE
Assumed care of pt at [address]. Pt resting in bed, VSS. Denies pain. Left side weaker than right, facial droop noted. Pt having difficulty speaking, but nods yes/no appropriately. Following commands. NSR per tele. SBP <150 maintained overnight.  Incontinent of

## 2020-10-20 NOTE — PROGRESS NOTES
10/20/20 1056   Clinical Encounter Type   Visited With Patient and family together   [address] of Sick-Anointing Patient declined anointing  (Patient declined  visit/Spouse stated patient did not want a . [facility] pr

## 2020-10-20 NOTE — PROGRESS NOTES
[facility]  Neuro Critical Care Progress Note    Rosamond Barthel Patient Status:  Inpatient    1940 MRN [id number]   [facility] 6NE-A Attending Gurinder Lucio MD   Hosp Day # 1 PCP MD Tamika Grace care    No further recommendation at this time please call any questions or concerns okay to transfer the oncology floor.     Reza Danielle MD  Neurocritical care  [place] [address]  Pager: [phone]  10/20/20 10:08 AM

## 2020-10-20 NOTE — PROGRESS NOTES
10/20/20 0937   Clinical Encounter Type   Visited With Patient and family together  (Nurse Aleksandra said that patient is not able to fill out POLST. Family present.  in room.  Explained paperwork on POLST.)   Referral From   (Consult)   Referral To

## 2020-10-20 NOTE — PROGRESS NOTES
Case discussed w/ Dr. Riaz James and Dr. Jessika Sawyer in tumor board. We agree that palliative/hospice care is best. I left VM again on Mr. Simon Cam home and cell numbers.

## 2020-10-20 NOTE — PLAN OF CARE
Assumed care of pt at 0730, can nod appropriately at times, can move arms, non verbal; see flowsheet for further neuro assessment, brief on for incontinence. Remains on room air, nasal trumpet in place for frequent NT suctioning.  Oral suctioning required a

## 2020-10-20 NOTE — PLAN OF CARE
Pt having hard time communicating, non verbal most of time; Dr. Jones Braman to bedside, spoke in length with pt and . Calling rest of family to discuss visiting mom in hospital or updating on diagnosis and prognosis.

## 2020-10-20 NOTE — CM/SW NOTE
SW met with pt and family including spouse and 2 adult daughters . Discussed hospice services . Talked about gip level of care for symptom management. Pt not needing interventions for pain , agitation , or respiratory distress .  Pt does have significant c

## 2020-10-20 NOTE — PROGRESS NOTES
Heme/Onc Progress Note - [facility]      Chief Complaint:    Follow up for evaluation and management of malignant melanoma with lung and brain metastases. Interim History: The family is at the bedside.  The , daughters and grandchildren were presen within the superior right cerebellum is 1.6 x 1.5 cm. A reference measurement of a metastasis within the right malini is 1.2 x 0.8 cm. A reference measurement of a metastasis within the anterior left temporal lobe is 1.4 x 1.3 cm.       No abnormal extra-axi

## 2020-10-20 NOTE — CM/SW NOTE
Order noted for hospice consult. [facility] will follow up with family. RN aware.

## 2020-10-20 NOTE — PROGRESS NOTES
[facility]  Neurosurgery Progress Note    Mansoor Chang Patient Status:  Inpatient    1940 MRN [id number]   [facility] 6NE-A Attending Lotus Skiff, MD   Hosp Day # 1 PCP Moose Connolly MD     Chief Complai

## 2020-10-21 PROBLEM — C79.9 METASTATIC MELANOMA (HCC): Status: ACTIVE | Noted: 2020-01-01

## 2020-10-21 NOTE — HOSPICE RN NOTE
Hospice SW and RN met with family in follow up today. Pt is awake and despite being unable to verbalize, is able to provide info for simple yes/no questions. She denies pain. She answered yes when asked if her breathing was difficult.   No s/s of pain ar

## 2020-10-21 NOTE — PROGRESS NOTES
.Pt seen and Examined.  Family at bedside   No complaints                           O/E     10/21/20  0300 10/21/20  0400 10/21/20  0500 10/21/20  0600   BP:  143/69     BP Location:  Right arm     Pulse: 54 59 62 63   Resp: 20 20 26 26   Temp:  98.2 °F (36

## 2020-10-21 NOTE — PROGRESS NOTES
Assumed care of pt at 0730. She is drowsy but nods head to yes/no questions. Family members at bedside. Repositioned for comfort. Malini Mckeon at bedside. Pt is appropriate for inpt hospice and family is agreeable.  Robinol, atropine given and scopolami

## 2020-10-21 NOTE — PROGRESS NOTES
.Pt seen and Examined. Family at bedside   No complaints. .    • scopolamine  1 patch Transdermal Q72H             HEENT- No pallor, No icterus. Neck- supple, No LAD  Lungs- brianna CTA. Heart-S1S2+, RRR  Abd- Bs+, No tenderness, No HSM.   Ext -Pp+, No pedal e

## 2020-10-21 NOTE — PLAN OF CARE
Assumed care of pt at [address] [facility]. Pt resting in bed, VSS. No s/s of pain noted. Oral and NT suction performed PRN to maintain secretions. NSR per tele. SBP normotensive. Neuro status waxes and wanes. At times pt more alert, following commands.  At times pt only m

## 2020-10-21 NOTE — CM/SW NOTE
SW met with family to discuss hospice services and goals of care.  signed consents. Pt will be inpatient hospice for congestions and respiratory.

## 2020-10-21 NOTE — PROGRESS NOTES
10/21/20 0483   Clinical Encounter Type   Visited With Patient and family together  (Pt intubated Daughter and grandaughter at bedside)   Routine Visit Introduction   Continue Visiting No   Crisis Visit Critical care   Family Spiritual Encounters   [place]

## 2020-10-22 NOTE — PLAN OF CARE
Assumed care of pt at 1915. Pt DNR on hospice care. At 1926 pt noted to be in asystole on tele. Zeinab GOODWIN and ilustrum RN listened to pt, no audible heart tones or breath sounds present. Asystole tele strip placed in pt chart.  Family at the bedside at time

## 2021-01-04 NOTE — DISCHARGE SUMMARY
[facility]    PATIENT'S NAME: Zenia Cruz   ATTENDING PHYSICIAN: Santana Arredondo M.D.    PATIENT ACCOUNT#:   [de-identified]    LOCATION:  [address] [place] [address]  MEDICAL RECORD #:   [id number]       YOB: 1940  ADMISSION DATE: